# Patient Record
Sex: MALE | Race: WHITE
[De-identification: names, ages, dates, MRNs, and addresses within clinical notes are randomized per-mention and may not be internally consistent; named-entity substitution may affect disease eponyms.]

---

## 2019-03-08 ENCOUNTER — TRANSCRIPTION ENCOUNTER (OUTPATIENT)
Age: 19
End: 2019-03-08

## 2019-03-13 PROBLEM — Z00.00 ENCOUNTER FOR PREVENTIVE HEALTH EXAMINATION: Status: ACTIVE | Noted: 2019-03-13

## 2019-03-14 ENCOUNTER — APPOINTMENT (OUTPATIENT)
Dept: GASTROENTEROLOGY | Facility: CLINIC | Age: 19
End: 2019-03-14

## 2019-04-01 ENCOUNTER — TRANSCRIPTION ENCOUNTER (OUTPATIENT)
Age: 19
End: 2019-04-01

## 2019-04-17 ENCOUNTER — APPOINTMENT (OUTPATIENT)
Dept: GASTROENTEROLOGY | Facility: CLINIC | Age: 19
End: 2019-04-17
Payer: MEDICAID

## 2019-04-17 VITALS
HEIGHT: 68 IN | WEIGHT: 156 LBS | OXYGEN SATURATION: 98 % | HEART RATE: 74 BPM | BODY MASS INDEX: 23.64 KG/M2 | SYSTOLIC BLOOD PRESSURE: 120 MMHG | DIASTOLIC BLOOD PRESSURE: 78 MMHG | TEMPERATURE: 97.8 F

## 2019-04-17 DIAGNOSIS — Z87.891 PERSONAL HISTORY OF NICOTINE DEPENDENCE: ICD-10-CM

## 2019-04-17 DIAGNOSIS — Z78.9 OTHER SPECIFIED HEALTH STATUS: ICD-10-CM

## 2019-04-17 DIAGNOSIS — Z83.79 FAMILY HISTORY OF OTHER DISEASES OF THE DIGESTIVE SYSTEM: ICD-10-CM

## 2019-04-17 PROCEDURE — 99401 PREV MED CNSL INDIV APPRX 15: CPT

## 2019-04-17 PROCEDURE — 99204 OFFICE O/P NEW MOD 45 MIN: CPT

## 2019-04-17 NOTE — PHYSICAL EXAM
[General Appearance - Alert] : alert [General Appearance - In No Acute Distress] : in no acute distress [Sclera] : the sclera and conjunctiva were normal [PERRL With Normal Accommodation] : pupils were equal in size, round, and reactive to light [Extraocular Movements] : extraocular movements were intact [Outer Ear] : the ears and nose were normal in appearance [Neck Appearance] : the appearance of the neck was normal [Oropharynx] : the oropharynx was normal [Neck Cervical Mass (___cm)] : no neck mass was observed [Jugular Venous Distention Increased] : there was no jugular-venous distention [Thyroid Diffuse Enlargement] : the thyroid was not enlarged [Thyroid Nodule] : there were no palpable thyroid nodules [Auscultation Breath Sounds / Voice Sounds] : lungs were clear to auscultation bilaterally [Heart Rate And Rhythm] : heart rate was normal and rhythm regular [Heart Sounds] : normal S1 and S2 [Murmurs] : no murmurs [Heart Sounds Gallop] : no gallops [Heart Sounds Pericardial Friction Rub] : no pericardial rub [Bowel Sounds] : normal bowel sounds [Edema] : there was no peripheral edema [] : no hepato-splenomegaly [Abdomen Soft] : soft [Abdomen Mass (___ Cm)] : no abdominal mass palpated [Periumbilical] : in the periumbilical area [Epigastric] : in the epigastric area [Cervical Lymph Nodes Enlarged Anterior Bilaterally] : anterior cervical [Cervical Lymph Nodes Enlarged Posterior Bilaterally] : posterior cervical [Supraclavicular Lymph Nodes Enlarged Bilaterally] : supraclavicular [Nail Clubbing] : no clubbing  or cyanosis of the fingernails [Skin Turgor] : normal skin turgor [Skin Color & Pigmentation] : normal skin color and pigmentation [No Focal Deficits] : no focal deficits [Oriented To Time, Place, And Person] : oriented to person, place, and time [Impaired Insight] : insight and judgment were intact [Affect] : the affect was normal

## 2019-04-17 NOTE — ASSESSMENT
[FreeTextEntry1] : After reviewing the images from his CT, hearing his story, and his physical exam, I believe his symptoms to be most likely to be secondary to chronic constipation.  I do not think his symptoms are secondary to gastritis, esophagitis or gallbladder related.  I have advised him to increase his intake of fresh fruits and vegetables, eliminate binding foods like rice and bananas, and to start MiraLAX once daily and to titrate to effect.  Advised him to stop the omeprazole, as I do not think it is helping or indicated.  I will see him again in six weeks, at which time we can discuss the need for an EGD, which I do not think he needs at this time.\par \par Counseled patient about the dangers of using vaporized nicotine.

## 2019-04-17 NOTE — HISTORY OF PRESENT ILLNESS
[de-identified] : This is a 19-year-old male patient presenting for evaluation for generalized abdominal pain.  He reports feeling "spasming" in his lower epigastrium, excessive eructation and periumbilical burning.  He was prescribed omeprazole after visit to , which he thinks may have helped a little.  He underwent a CT A/P in February that revealed a large stool burden in the colon.  He reports moving his bowels daily, and describes his stool as [place] 2.  He has lost 15-lbs over the past 4-5 months after changing his diet and exercising.  Denies nausea, vomiting, diarrhea, or rectal bleeding.  Says his symptoms are worse after eating dairy.  There is no family history of colorectal cancer, IBD or celiac disease.  Denies heartburn.

## 2019-05-29 ENCOUNTER — APPOINTMENT (OUTPATIENT)
Dept: GASTROENTEROLOGY | Facility: CLINIC | Age: 19
End: 2019-05-29

## 2019-08-08 ENCOUNTER — TRANSCRIPTION ENCOUNTER (OUTPATIENT)
Age: 19
End: 2019-08-08

## 2019-08-09 ENCOUNTER — APPOINTMENT (OUTPATIENT)
Dept: GASTROENTEROLOGY | Facility: CLINIC | Age: 19
End: 2019-08-09
Payer: COMMERCIAL

## 2019-08-31 ENCOUNTER — TRANSCRIPTION ENCOUNTER (OUTPATIENT)
Age: 19
End: 2019-08-31

## 2019-09-09 ENCOUNTER — TRANSCRIPTION ENCOUNTER (OUTPATIENT)
Age: 19
End: 2019-09-09

## 2019-09-12 ENCOUNTER — EMERGENCY (EMERGENCY)
Facility: HOSPITAL | Age: 19
LOS: 1 days | Discharge: HOME | End: 2019-09-12
Attending: EMERGENCY MEDICINE | Admitting: EMERGENCY MEDICINE
Payer: MEDICAID

## 2019-09-12 VITALS
TEMPERATURE: 98 F | SYSTOLIC BLOOD PRESSURE: 111 MMHG | OXYGEN SATURATION: 98 % | WEIGHT: 169.09 LBS | DIASTOLIC BLOOD PRESSURE: 74 MMHG | RESPIRATION RATE: 18 BRPM | HEART RATE: 69 BPM | HEIGHT: 67 IN

## 2019-09-12 VITALS
OXYGEN SATURATION: 100 % | SYSTOLIC BLOOD PRESSURE: 115 MMHG | DIASTOLIC BLOOD PRESSURE: 72 MMHG | HEART RATE: 77 BPM | RESPIRATION RATE: 18 BRPM

## 2019-09-12 DIAGNOSIS — R42 DIZZINESS AND GIDDINESS: ICD-10-CM

## 2019-09-12 PROCEDURE — 99284 EMERGENCY DEPT VISIT MOD MDM: CPT

## 2019-09-12 PROCEDURE — 96374 THER/PROPH/DIAG INJ IV PUSH: CPT

## 2019-09-12 PROCEDURE — 99284 EMERGENCY DEPT VISIT MOD MDM: CPT | Mod: 25

## 2019-09-12 RX ORDER — SODIUM CHLORIDE 9 MG/ML
1000 INJECTION INTRAMUSCULAR; INTRAVENOUS; SUBCUTANEOUS ONCE
Refills: 0 | Status: COMPLETED | OUTPATIENT
Start: 2019-09-12 | End: 2019-09-12

## 2019-09-12 RX ORDER — METOCLOPRAMIDE HCL 10 MG
10 TABLET ORAL ONCE
Refills: 0 | Status: COMPLETED | OUTPATIENT
Start: 2019-09-12 | End: 2019-09-12

## 2019-09-12 RX ORDER — ACETAMINOPHEN 500 MG
650 TABLET ORAL ONCE
Refills: 0 | Status: COMPLETED | OUTPATIENT
Start: 2019-09-12 | End: 2019-09-12

## 2019-09-12 RX ADMIN — SODIUM CHLORIDE 1000 MILLILITER(S): 9 INJECTION INTRAMUSCULAR; INTRAVENOUS; SUBCUTANEOUS at 20:12

## 2019-09-12 RX ADMIN — Medication 10 MILLIGRAM(S): at 20:12

## 2019-09-12 RX ADMIN — Medication 650 MILLIGRAM(S): at 20:12

## 2019-09-12 NOTE — ED PROVIDER NOTE - CLINICAL SUMMARY MEDICAL DECISION MAKING FREE TEXT BOX
19 yr old male with no pmhx presents to c/o fatigue, nausea, with one episode of vomiting today. Also c/o frontal headache, took excedrin migraine 3 hours ago. Pt was seen at [facility] 3 days ago due to chest pain and had workup and dc home. Denies any diarrhea, fever, chills, neck pain, sick contacts, recent travel, chest pain, sob, dizziness.    normal exam, neurologically intact, abdomen- soft non tender   reglan, tylenol, fluids given- pt feeling better and tolerating po in ED   pt stable for discharge and to follow up with pmd

## 2019-09-12 NOTE — ED ADULT NURSE NOTE - OBJECTIVE STATEMENT
20 y/o male came in c/o 3 days of ha nausea dizziness and chest pain. pt was seen at [facility] 3 days ago for chest pain was worked up and d/c. pt states he came in today bc of headache in temperal area not resolved. pt states he had 1 episode of vomiting. pt states dizziness like the room is spinning. pts neuro intact. no chest pain at this time. MABEL. pt recently quit vaping 5 days ago and believes this could be the reason.

## 2019-09-12 NOTE — ED ADULT NURSE NOTE - CHIEF COMPLAINT QUOTE
Pt c/o feeling dizzy with nausea/vomiting, feels "squeezing" on his temples, took excedrin migraine 3 hours ago, pt was in [place] 3 days ago for chest pain

## 2019-09-12 NOTE — ED PROVIDER NOTE - OBJECTIVE STATEMENT
19 yr old male with no pmhx presents to c/o fatigue, nausea, with one episode of vomiting today. Also c/o frontal headache, took excedrin migraine 3 hours ago. Pt was seen at [facility] 3 days ago due to chest pain and had workup and dc home. Denies any diarrhea, fever, chills, neck pain, sick contacts, recent travel, chest pain, sob, dizziness.

## 2019-09-12 NOTE — ED PROVIDER NOTE - NSFOLLOWUPINSTRUCTIONS_ED_ALL_ED_FT
General Headache    WHAT YOU NEED TO KNOW:    Headache pain may be mild or severe. Common causes include stress, medicines, and head injuries. Sleep problems, allergies, and hormone changes can also cause a headache. You may have frequent headaches that have no clear cause. Pain may start in another part of your body and move to your head. Headache pain can also move to other parts of your body. A headache can cause other symptoms, such as nausea and vomiting. A severe headache may be a sign of a stroke or other serious problem that needs immediate treatment.    DISCHARGE INSTRUCTIONS:    Call 911 for any of the following:     You have any of the following signs of a stroke:   Numbness or drooping on one side of your face       Weakness in an arm or leg      Confusion or difficulty speaking      Dizziness, a severe headache, or vision loss        Return to the emergency department if:     You have a headache with neck stiffness and a fever.      You have a constant headache and are vomiting.      You have severe pain that does not get better after you take pain medicine.      You have a headache and the pain worsens when you look into light.      You have a headache and vision changes, such as blurred vision.      You have a headache and are forgetful or confused.    Contact your healthcare provider if:     You have a headache each day that does not get better, even after treatment.      You have changes in your headaches, or new symptoms that occur when you have a headache.      Others you live or work with also have headaches.      You have questions or concerns about your condition or care.    Medicines: You may need any of the following:     Medicines may be given to prevent or treat headache pain. Do not wait until the pain is severe to take your medicine. Ask your healthcare provider how to take the medicine safely.       NSAIDs, such as ibuprofen, help decrease swelling, pain, and fever. This medicine is available with or without a doctor's order. NSAIDs can cause stomach bleeding or kidney problems in certain people. If you take blood thinner medicine, always ask if NSAIDs are safe for you. Always read the medicine label and follow directions. Do not give these medicines to children under 6 months of age without direction from your child's healthcare provider.      Acetaminophen decreases pain and fever. It is available without a doctor's order. Ask how much to take and how often to take it. Follow directions. Read the labels of all other medicines you are using to see if they also contain acetaminophen, or ask your doctor or pharmacist. Acetaminophen can cause liver damage if not taken correctly. Do not use more than 4 grams (4,000 milligrams) total of acetaminophen in one day.       Antinausea medicine may be given to calm your stomach and help prevent vomiting.      Take your medicine as directed. Contact your healthcare provider if you think your medicine is not helping or if you have side effects. Tell him of her if you are allergic to any medicine. Keep a list of the medicines, vitamins, and herbs you take. Include the amounts, and when and why you take them. Bring the list or the pill bottles to follow-up visits. Carry your medicine list with you in case of an emergency.    Manage your symptoms:     Rest in a dark and quiet room. This may help decrease your pain.      Apply heat or ice as directed. Heat or ice may help decrease pain or muscle spasms. Apply heat or ice on the area for 20 minutes every 2 hours for as many days as directed. Your healthcare provider may recommend that you alternate heat and ice.      Relax your muscles to help relieve a headache. Lie down in a comfortable position and close your eyes. Relax your muscles slowly. Start at your toes and work your way up your body. A massage or warm bath may also help relax your muscles.    Keep a headache record: Record the dates and times that you get headaches, and what you were doing before the headache started. Also record what you ate and drank in the 24 hours before the headache started. This might help your healthcare provider find the cause of your headaches and make a treatment plan. The record can also help you avoid headache triggers or manage your symptoms.    Get enough sleep: You should get 8 to 10 hours of sleep each night. Create a sleep schedule. Go to bed and wake up at the same times each day. It may be helpful to do something relaxing before bed. Do not watch television right before bed.    Do not smoke: Nicotine and other chemicals in cigarettes and cigars can trigger a headache or make it worse. Ask your healthcare provider for information if you currently smoke and need help to quit. E-cigarettes or smokeless tobacco still contain nicotine. Talk to your healthcare provider before you use these products.     Drink liquids as directed: You may need to drink more liquid to prevent dehydration. Dehydration can cause a headache. Ask your healthcare provider how much liquid to drink each day and which liquids are best for you.     Limit caffeine and alcohol as directed: Your headaches may be triggered by caffeine or alcohol. You may also develop a headache if you drink caffeine regularly and suddenly stop.    Eat a variety of healthy foods: Do not skip meals. Too little food can trigger a headache. Include fruits, vegetables, whole-grain breads, low-fat dairy products, beans, lean meat, and fish. Do not have trigger foods, such as chocolate and red wine. Foods that contain gluten, nitrates, MSG, or artificial sweeteners may also trigger a headache.    Follow up with your healthcare provider as directed: Write down your questions so you remember to ask them during your visits.

## 2019-09-12 NOTE — ED PROVIDER NOTE - CARE PROVIDER_API CALL
Silvano Arango (MD)  Neurology  [address], [address]  [place], [place] [id number]  Phone: [phone]  Fax: [phone]  Follow Up Time:

## 2019-09-12 NOTE — ED ADULT TRIAGE NOTE - CHIEF COMPLAINT QUOTE
Pt c/o feeling dizzy with nausea/vomiting, feels "squeezing" on his temples, took excedrin migraine 3 hours ago, pt was in [place] 3 days ago for chest pain

## 2019-09-12 NOTE — ED PROVIDER NOTE - ATTENDING CONTRIBUTION TO CARE
I have personally seen and examined this patient. I have fully participated in the care of this patient. I have reviewed all pertinent clinical information, including history physical exam, plan and the PA's note and agree except as noted  19 yr old male with no pmhx presents to c/o fatigue, nausea, with one episode of vomiting today. Also c/o frontal headache, took excedrin migraine 3 hours ago. Pt was seen at [facility] 3 days ago due to chest pain and had workup and dc home. Denies any diarrhea, fever, chills, neck pain, sick contacts, recent travel, chest pain, sob, dizziness.  PE: General:     NAD, well-nourished, well-appearing  Head:     NC/AT, EOMI, oral mucosa moist  Neck:     supple  Lungs:     CTA b/l, no w/r/r  CVS:     S1S2, RRR, no m/g/r  Abd:     +BS, s/nt/nd, no organomegaly  Ext:    2+ radial and pedal pulses, no c/c/e  Neuro: grossly intact

## 2019-09-12 NOTE — ED PROVIDER NOTE - PATIENT PORTAL LINK FT
You can access the FollowMyHealth Patient Portal offered by [facility] by registering at the following website: [email or web address]. By joining [employer]’s FollowMyHealth portal, you will also be able to view your health information using other applications (apps) compatible with our system.

## 2019-09-24 ENCOUNTER — APPOINTMENT (OUTPATIENT)
Dept: GASTROENTEROLOGY | Facility: CLINIC | Age: 19
End: 2019-09-24
Payer: COMMERCIAL

## 2019-09-24 VITALS
HEART RATE: 74 BPM | SYSTOLIC BLOOD PRESSURE: 119 MMHG | RESPIRATION RATE: 16 BRPM | DIASTOLIC BLOOD PRESSURE: 71 MMHG | OXYGEN SATURATION: 99 %

## 2019-09-24 DIAGNOSIS — Z78.9 OTHER SPECIFIED HEALTH STATUS: ICD-10-CM

## 2019-09-24 PROCEDURE — 99214 OFFICE O/P EST MOD 30 MIN: CPT

## 2019-09-24 RX ORDER — POLYETHYLENE GLYCOL 3350 17 G/17G
17 POWDER, FOR SOLUTION ORAL DAILY
Qty: 1 | Refills: 11 | Status: ACTIVE | COMMUNITY
Start: 2019-04-17 | End: 1900-01-01

## 2019-09-24 NOTE — ASSESSMENT
[FreeTextEntry1] : Again, I believe this is suffering from irritable bowel syndrome with constipation.  I advised them to restart MiraLax 17 g once daily and titrate the dose and timing to the desired effect of one soft bowel movement daily.  Regards to his intermittent chest pain, I suspect that this is anxiety related, as he received no improvement with a proton pump inhibitor.  I offered an EGD for further evaluation, and the patient would like to hold off for now.  I also advised that he purchased some over-the-counter IBGard.\par \par Follow up with me in in March or sooner if necessary

## 2019-09-24 NOTE — HISTORY OF PRESENT ILLNESS
[de-identified] : This is a pleasant well interval appointment for this 19-year-old male patient who initially saw in April or diffuse abdominal pain, for which he underwent a CT abdomen and pelvis that was unrevealing.  I thought at that time that he was suffering from chronic constipation, and I started him on MiraLax 17 g once daily.  He reported that his abdominal symptoms abated while he was taking the MiraLax but stopped for unclear reasons.  He reports he is moving his bowels every 2-3 days.  He also complains of borborygmi accompanied by random, fleeting abdominal pains.  He also reports that on two occasions within the last couple of weeks, he cleared his throat and a spat up blood.  He denies any rectal bleeding or melena.  He denies any nausea or vomiting.  He reports intermittent chest pain for which he went to the emergency department, underwent an electrocardiogram and chest x-ray that were negative.

## 2019-09-24 NOTE — PHYSICAL EXAM
[General Appearance - Alert] : alert [Sclera] : the sclera and conjunctiva were normal [General Appearance - In No Acute Distress] : in no acute distress [Extraocular Movements] : extraocular movements were intact [PERRL With Normal Accommodation] : pupils were equal in size, round, and reactive to light [Outer Ear] : the ears and nose were normal in appearance [Oropharynx] : the oropharynx was normal [Neck Appearance] : the appearance of the neck was normal [Neck Cervical Mass (___cm)] : no neck mass was observed [Jugular Venous Distention Increased] : there was no jugular-venous distention [Thyroid Nodule] : there were no palpable thyroid nodules [Thyroid Diffuse Enlargement] : the thyroid was not enlarged [Auscultation Breath Sounds / Voice Sounds] : lungs were clear to auscultation bilaterally [Heart Rate And Rhythm] : heart rate was normal and rhythm regular [Heart Sounds Gallop] : no gallops [Heart Sounds] : normal S1 and S2 [Murmurs] : no murmurs [Heart Sounds Pericardial Friction Rub] : no pericardial rub [Edema] : there was no peripheral edema [Bowel Sounds] : normal bowel sounds [Abdomen Mass (___ Cm)] : no abdominal mass palpated [Abdomen Soft] : soft [] : no hepato-splenomegaly [Periumbilical] : in the periumbilical area [Epigastric] : in the epigastric area [Cervical Lymph Nodes Enlarged Posterior Bilaterally] : posterior cervical [Supraclavicular Lymph Nodes Enlarged Bilaterally] : supraclavicular [Cervical Lymph Nodes Enlarged Anterior Bilaterally] : anterior cervical [Skin Color & Pigmentation] : normal skin color and pigmentation [Nail Clubbing] : no clubbing  or cyanosis of the fingernails [Skin Turgor] : normal skin turgor [No Focal Deficits] : no focal deficits [Impaired Insight] : insight and judgment were intact [Oriented To Time, Place, And Person] : oriented to person, place, and time [Affect] : the affect was normal

## 2019-10-05 ENCOUNTER — EMERGENCY (EMERGENCY)
Facility: HOSPITAL | Age: 19
LOS: 1 days | Discharge: HOME | End: 2019-10-05
Attending: INTERNAL MEDICINE | Admitting: INTERNAL MEDICINE
Payer: MEDICAID

## 2019-10-05 ENCOUNTER — TRANSCRIPTION ENCOUNTER (OUTPATIENT)
Age: 19
End: 2019-10-05

## 2019-10-05 VITALS
SYSTOLIC BLOOD PRESSURE: 121 MMHG | DIASTOLIC BLOOD PRESSURE: 69 MMHG | RESPIRATION RATE: 15 BRPM | HEART RATE: 67 BPM | TEMPERATURE: 98 F | OXYGEN SATURATION: 100 %

## 2019-10-05 PROBLEM — Z78.9 OTHER SPECIFIED HEALTH STATUS: Chronic | Status: ACTIVE | Noted: 2019-09-12

## 2019-10-05 LAB — OB PNL STL: NEGATIVE — SIGNIFICANT CHANGE UP

## 2019-10-05 PROCEDURE — 99283 EMERGENCY DEPT VISIT LOW MDM: CPT

## 2019-10-05 RX ORDER — SODIUM CHLORIDE 9 MG/ML
1000 INJECTION INTRAMUSCULAR; INTRAVENOUS; SUBCUTANEOUS ONCE
Refills: 0 | Status: DISCONTINUED | OUTPATIENT
Start: 2019-10-05 | End: 2019-10-05

## 2019-10-05 RX ORDER — FAMOTIDINE 10 MG/ML
20 INJECTION INTRAVENOUS ONCE
Refills: 0 | Status: DISCONTINUED | OUTPATIENT
Start: 2019-10-05 | End: 2019-10-05

## 2019-10-05 NOTE — ED PROVIDER NOTE - CPE EDP GASTRO NORM
11/19/20        Minesh Abernathy  [address] [facility] [address]      Dear Higinio Erickson,    [address] [facility] records indicate that you have outstanding lab work and or testing that was ordered for you and has not yet been completed:  Orders Placed This Encounter - - -

## 2019-10-05 NOTE — ED PROVIDER NOTE - PROGRESS NOTE DETAILS
MICHAEL Wagner: pt feels better, OBT negative, recent CT report no acute findings.  Discharge and results reviewed with patient.

## 2019-10-05 NOTE — ED PROVIDER NOTE - NSFOLLOWUPINSTRUCTIONS_ED_ALL_ED_FT
Follow up with your Primary Medical Doctor in 1-2 days.  Follow up with your Gastroenterologist in 1-2 days or see attached list.  Rest,  Drink plenty of fluids.  Return to the ER for any persistent/worsening or new symptoms fevers, chills, abdominal pain, nausea, vomiting or any concerning symptoms.

## 2019-10-05 NOTE — ED PROVIDER NOTE - ATTENDING CONTRIBUTION TO CARE
LOIS Benitez MD performed a history and physical exam of the patient and discussed their management with the resident and /or advanced care provider. I reviewed the resident and /or ACP's note and agree with the documented findings and plan of care. My medical decision making and observations are found above.    Awake, Alert, Conversant.  Resting comfortably.  Breath sounds clear in all lung fields.  Normal and regular heart rate without murmurs, rubs, or gallops.  Normal S1/S2.  Abdomen soft and nontender without rebound/guarding, no CVAT.  No lower extremity swelling or tenderness.  Oriented and conversant with fluent speech, moving all extremities with good strength.

## 2019-10-05 NOTE — ED PROVIDER NOTE - PATIENT PORTAL LINK FT
You can access the FollowMyHealth Patient Portal offered by [facility] by registering at the following website: [email or web address]. By joining [employer]’s FollowMyHealth portal, you will also be able to view your health information using other applications (apps) compatible with our system.

## 2019-10-05 NOTE — ED ADULT TRIAGE NOTE - CHIEF COMPLAINT QUOTE
Pt c/o lower abdominal pain with dark stools for the past 4 days.  Denies any nausea/vomiting.  Denies any urinary symptoms.  Pt states is being followed by gastro for the past 7 months for upper abdominal pain that's states "he doesn't need anything."  Was evaluated at [facility] today that told him to ED for ultrasound.  Denies any PHMx.

## 2019-10-05 NOTE — ED PROVIDER NOTE - OBJECTIVE STATEMENT
18 y/o male with a hx of IBS presents to the ER c/o 3-4 days of intermittent crampy abdominal pain.  Pt reports an episode of dark stools this AM, non bloody, non watery.  Pt reports similar pain intermittent over there past 7-8 months.  Pt had negative CT scan approximately 6 months ago.  Pt denies fevers, chills, nausea, vomiting, weakness, dizziness, back pain, dysuria, frequency, hematuria.

## 2019-10-05 NOTE — ED PROVIDER NOTE - CLINICAL SUMMARY MEDICAL DECISION MAKING FREE TEXT BOX
Dr. Benitez: I agree with the above provided history and exam and addend/modify it as follows.  19 M hx abdominal currently undergoing outpatient work-up: possibly IBS P/W lower abdominal pain.  Also noted constipation lately with a cramping and intermittent component to the pain Initially mildly tender on exam, no longer noted on re-exam.  Afebrile and without nausea/vomiting.  No bloody or melenic stools.  Tolerating a diet well.  Likely constipation.  Doubt appendicitis given the patient is tolerating PO without nausea/vomiting, afebrile, with a benign exam.  Not c/w diverticulitis/colitis given the patient afebrile and without diarrhea.  Plan for PO hydration, stable for discharge to outpatient work-up, increase outpatient bowel regimen.

## 2019-10-16 ENCOUNTER — APPOINTMENT (OUTPATIENT)
Dept: GASTROENTEROLOGY | Facility: CLINIC | Age: 19
End: 2019-10-16

## 2019-10-16 ENCOUNTER — APPOINTMENT (OUTPATIENT)
Dept: GASTROENTEROLOGY | Facility: CLINIC | Age: 19
End: 2019-10-16
Payer: COMMERCIAL

## 2019-10-16 VITALS
HEIGHT: 68 IN | HEART RATE: 70 BPM | WEIGHT: 160 LBS | SYSTOLIC BLOOD PRESSURE: 116 MMHG | BODY MASS INDEX: 24.25 KG/M2 | TEMPERATURE: 98.4 F | OXYGEN SATURATION: 98 % | DIASTOLIC BLOOD PRESSURE: 78 MMHG

## 2019-10-16 PROCEDURE — 99215 OFFICE O/P EST HI 40 MIN: CPT

## 2019-10-16 NOTE — PHYSICAL EXAM
[General Appearance - Alert] : alert [General Appearance - In No Acute Distress] : in no acute distress [Sclera] : the sclera and conjunctiva were normal [PERRL With Normal Accommodation] : pupils were equal in size, round, and reactive to light [Extraocular Movements] : extraocular movements were intact [Outer Ear] : the ears and nose were normal in appearance [Oropharynx] : the oropharynx was normal [Neck Appearance] : the appearance of the neck was normal [Neck Cervical Mass (___cm)] : no neck mass was observed [Jugular Venous Distention Increased] : there was no jugular-venous distention [Thyroid Diffuse Enlargement] : the thyroid was not enlarged [Thyroid Nodule] : there were no palpable thyroid nodules [Auscultation Breath Sounds / Voice Sounds] : lungs were clear to auscultation bilaterally [Heart Rate And Rhythm] : heart rate was normal and rhythm regular [Heart Sounds] : normal S1 and S2 [Heart Sounds Gallop] : no gallops [Murmurs] : no murmurs [Heart Sounds Pericardial Friction Rub] : no pericardial rub [Edema] : there was no peripheral edema [Bowel Sounds] : normal bowel sounds [Abdomen Soft] : soft [] : no hepato-splenomegaly [Abdomen Mass (___ Cm)] : no abdominal mass palpated [Suprapubic] : in the suprapubic area [LLQ] : in the left lower quadrant [Cervical Lymph Nodes Enlarged Posterior Bilaterally] : posterior cervical [Cervical Lymph Nodes Enlarged Anterior Bilaterally] : anterior cervical [Supraclavicular Lymph Nodes Enlarged Bilaterally] : supraclavicular [Nail Clubbing] : no clubbing  or cyanosis of the fingernails [Skin Color & Pigmentation] : normal skin color and pigmentation [Skin Turgor] : normal skin turgor [No Focal Deficits] : no focal deficits [Oriented To Time, Place, And Person] : oriented to person, place, and time [Impaired Insight] : insight and judgment were intact [Affect] : the affect was normal

## 2019-10-16 NOTE — ASSESSMENT
[FreeTextEntry1] : Will schedule diagnostic colonoscopy to rule out IBD, malignancy.  MoviPrep ordered.

## 2019-10-16 NOTE — HISTORY OF PRESENT ILLNESS
[de-identified] : This is a pleasant 19-year-old male patient who initially saw in April or diffuse abdominal pain, for which he underwent a CT abdomen and pelvis that was unrevealing.  I thought at that time that he was suffering from chronic constipation, and I started him on MiraLax 17 g once daily.  He reported that his abdominal symptoms abated while he was taking the MiraLax but stopped for unclear reasons.  He has presented to the ED twice since his index appointment with me, most recently about a week ago with complaints of dark stool (FOBT negative) and LLQ pain.  He was told he was suffering from constipation and was discharged with normal labs and no imaging.  He continues to experience unrelenting suprapubic and LLQ pain.  He reports that his stools have changed in consistency and that he has little appetite for food.  Tried IBGard, which was not helpful.  Restarted MiraLAX but stopped because it made him feel "weird".

## 2019-12-12 ENCOUNTER — TRANSCRIPTION ENCOUNTER (OUTPATIENT)
Age: 19
End: 2019-12-12

## 2020-02-11 ENCOUNTER — RX CHANGE (OUTPATIENT)
Age: 20
End: 2020-02-11

## 2020-02-13 ENCOUNTER — OUTPATIENT (OUTPATIENT)
Dept: OUTPATIENT SERVICES | Facility: HOSPITAL | Age: 20
LOS: 1 days | End: 2020-02-13
Payer: COMMERCIAL

## 2020-02-13 ENCOUNTER — APPOINTMENT (OUTPATIENT)
Dept: GASTROENTEROLOGY | Facility: GI CENTER | Age: 20
End: 2020-02-13
Payer: MEDICAID

## 2020-02-13 DIAGNOSIS — R19.4 CHANGE IN BOWEL HABIT: ICD-10-CM

## 2020-02-13 DIAGNOSIS — K58.1 IRRITABLE BOWEL SYNDROME WITH CONSTIPATION: ICD-10-CM

## 2020-02-13 DIAGNOSIS — R10.32 LEFT LOWER QUADRANT PAIN: ICD-10-CM

## 2020-02-13 DIAGNOSIS — R63.0 ANOREXIA: ICD-10-CM

## 2020-02-13 DIAGNOSIS — R19.8 OTHER SPECIFIED SYMPTOMS AND SIGNS INVOLVING THE DIGESTIVE SYSTEM AND ABDOMEN: ICD-10-CM

## 2020-02-13 DIAGNOSIS — R10.2 PELVIC AND PERINEAL PAIN: ICD-10-CM

## 2020-02-13 PROCEDURE — 45378 DIAGNOSTIC COLONOSCOPY: CPT

## 2020-02-13 RX ORDER — POLYETHYLENE GLYOCOL 3350, SODIUM CHLORIDE, SODIUM BICARBONATE AND POTASSIUM CHLORIDE 420; 11.2; 5.72; 1.48 G/4L; G/4L; G/4L; G/4L
420 POWDER, FOR SOLUTION NASOGASTRIC; ORAL
Qty: 1 | Refills: 0 | Status: COMPLETED | COMMUNITY
Start: 2020-02-11 | End: 2020-02-13

## 2020-02-13 RX ORDER — POLYETHYLENE GLYCOL 3350, SODIUM SULFATE, SODIUM CHLORIDE, POTASSIUM CHLORIDE, ASCORBIC ACID, SODIUM ASCORBATE 7.5-2.691G
100 KIT ORAL
Qty: 1 | Refills: 0 | Status: COMPLETED | COMMUNITY
Start: 2019-10-16 | End: 2020-02-13

## 2020-02-13 NOTE — PHYSICAL EXAM
[General Appearance - Alert] : alert [General Appearance - In No Acute Distress] : in no acute distress [PERRL With Normal Accommodation] : pupils were equal in size, round, and reactive to light [Sclera] : the sclera and conjunctiva were normal [Extraocular Movements] : extraocular movements were intact [Outer Ear] : the ears and nose were normal in appearance [Oropharynx] : the oropharynx was normal [Neck Appearance] : the appearance of the neck was normal [Neck Cervical Mass (___cm)] : no neck mass was observed [Jugular Venous Distention Increased] : there was no jugular-venous distention [Thyroid Diffuse Enlargement] : the thyroid was not enlarged [Thyroid Nodule] : there were no palpable thyroid nodules [Auscultation Breath Sounds / Voice Sounds] : lungs were clear to auscultation bilaterally [Heart Rate And Rhythm] : heart rate was normal and rhythm regular [Heart Sounds] : normal S1 and S2 [Heart Sounds Gallop] : no gallops [Murmurs] : no murmurs [Edema] : there was no peripheral edema [Heart Sounds Pericardial Friction Rub] : no pericardial rub [Bowel Sounds] : normal bowel sounds [Abdomen Soft] : soft [] : no hepato-splenomegaly [LLQ] : in the left lower quadrant [Suprapubic] : in the suprapubic area [Abdomen Mass (___ Cm)] : no abdominal mass palpated [Cervical Lymph Nodes Enlarged Posterior Bilaterally] : posterior cervical [Cervical Lymph Nodes Enlarged Anterior Bilaterally] : anterior cervical [Supraclavicular Lymph Nodes Enlarged Bilaterally] : supraclavicular [Nail Clubbing] : no clubbing  or cyanosis of the fingernails [Skin Color & Pigmentation] : normal skin color and pigmentation [Skin Turgor] : normal skin turgor [No Focal Deficits] : no focal deficits [Oriented To Time, Place, And Person] : oriented to person, place, and time [Impaired Insight] : insight and judgment were intact [Affect] : the affect was normal

## 2020-02-13 NOTE — PHYSICAL EXAM
[General Appearance - Alert] : alert [General Appearance - In No Acute Distress] : in no acute distress [Sclera] : the sclera and conjunctiva were normal [PERRL With Normal Accommodation] : pupils were equal in size, round, and reactive to light [Extraocular Movements] : extraocular movements were intact [Outer Ear] : the ears and nose were normal in appearance [Oropharynx] : the oropharynx was normal [Neck Cervical Mass (___cm)] : no neck mass was observed [Neck Appearance] : the appearance of the neck was normal [Jugular Venous Distention Increased] : there was no jugular-venous distention [Thyroid Diffuse Enlargement] : the thyroid was not enlarged [Thyroid Nodule] : there were no palpable thyroid nodules [Auscultation Breath Sounds / Voice Sounds] : lungs were clear to auscultation bilaterally [Heart Rate And Rhythm] : heart rate was normal and rhythm regular [Heart Sounds] : normal S1 and S2 [Heart Sounds Gallop] : no gallops [Murmurs] : no murmurs [Edema] : there was no peripheral edema [Heart Sounds Pericardial Friction Rub] : no pericardial rub [Abdomen Soft] : soft [Bowel Sounds] : normal bowel sounds [] : no hepato-splenomegaly [Abdomen Mass (___ Cm)] : no abdominal mass palpated [LLQ] : in the left lower quadrant [Suprapubic] : in the suprapubic area [Cervical Lymph Nodes Enlarged Posterior Bilaterally] : posterior cervical [Cervical Lymph Nodes Enlarged Anterior Bilaterally] : anterior cervical [Nail Clubbing] : no clubbing  or cyanosis of the fingernails [Supraclavicular Lymph Nodes Enlarged Bilaterally] : supraclavicular [Skin Color & Pigmentation] : normal skin color and pigmentation [Skin Turgor] : normal skin turgor [Oriented To Time, Place, And Person] : oriented to person, place, and time [No Focal Deficits] : no focal deficits [Affect] : the affect was normal [Impaired Insight] : insight and judgment were intact

## 2020-02-13 NOTE — PROCEDURE
[Change in Bowel Habits] : change in bowel habits [Abdominal Pain] : abdominal pain [Procedure Explained] : The procedure was explained [Allergies Reviewed] : allergies reviewed. [Risks] : Risks [Benefits] : benefits [Alternatives] : alternatives [Consent Obtained] : written consent was obtained prior to the procedure and is detailed in the patient's record [Infection] : risk of infection [Bleeding] : bleeding risk [Patient] : the patient [Bowel Prep Kit] : the patient took the appropriate bowel preparation kit as directed [Approved Diet Followed] : the patient avoided solid foods and adhered to the approved diet list for 24 hours prior to the procedure [Automated Blood Pressure Cuff] : automated blood pressure cuff [Cardiac Monitor] : cardiac monitor [Pulse Oximeter] : pulse oximeter [2] : 2 [Sedation Clearance] : the patient was cleared for moderate sedation [Performed By: ___] : Performed by:  NICK [Propofol ___ mg IV] : Propofol [unfilled] ~Umg intravenously [Prep Qualtiy: ___] : Prep Quality:  [unfilled] [Left Lateral Decubitus] : The patient was positioned in the left lateral decubitus position [Withdrawal Time: ___] : Withdrawal Time:  [unfilled] [Abnormal Rectum] : a normal rectum [External Hemorrhoids] : no external hemorrhoids [Normal Prostate] : a normal prostate [Cecum (Landmarks)] : and guided to the cecum which was identified by the anatomic landmarks of the appendiceal orifice and ileocecal valve [Terminal Ileum via Ileocecal Valve] : and the terminal ileum was examined by entering the ileocecal valve [No Difficulty] : without difficulty [Single Pass Needed] : after a single pass [Retroflex View] : a retroflex view of the rectum was performed [Insufflated] : insufflated [Patient Rotated Into Alternating Positions] : the patient was not rotated [Tolerated Well] : the patient tolerated the procedure well [Normal] : Normal [No Complications] : There were no complications [Vital Signs Stable] : the vital signs were stable [FreeTextEntry2] : [employer] [id number]

## 2020-02-13 NOTE — PROCEDURE
[Change in Bowel Habits] : change in bowel habits [Abdominal Pain] : abdominal pain [Procedure Explained] : The procedure was explained [Allergies Reviewed] : allergies reviewed. [Risks] : Risks [Benefits] : benefits [Alternatives] : alternatives [Consent Obtained] : written consent was obtained prior to the procedure and is detailed in the patient's record [Infection] : risk of infection [Bleeding] : bleeding risk [Patient] : the patient [Bowel Prep Kit] : the patient took the appropriate bowel preparation kit as directed [Approved Diet Followed] : the patient avoided solid foods and adhered to the approved diet list for 24 hours prior to the procedure [Automated Blood Pressure Cuff] : automated blood pressure cuff [Cardiac Monitor] : cardiac monitor [2] : 2 [Pulse Oximeter] : pulse oximeter [Sedation Clearance] : the patient was cleared for moderate sedation [Performed By: ___] : Performed by:  NICK [Prep Qualtiy: ___] : Prep Quality:  [unfilled] [Propofol ___ mg IV] : Propofol [unfilled] ~Umg intravenously [Withdrawal Time: ___] : Withdrawal Time:  [unfilled] [Left Lateral Decubitus] : The patient was positioned in the left lateral decubitus position [Abnormal Rectum] : a normal rectum [External Hemorrhoids] : no external hemorrhoids [Normal Prostate] : a normal prostate [Cecum (Landmarks)] : and guided to the cecum which was identified by the anatomic landmarks of the appendiceal orifice and ileocecal valve [Terminal Ileum via Ileocecal Valve] : and the terminal ileum was examined by entering the ileocecal valve [No Difficulty] : without difficulty [Insufflated] : insufflated [Retroflex View] : a retroflex view of the rectum was performed [Single Pass Needed] : after a single pass [Patient Rotated Into Alternating Positions] : the patient was not rotated [Normal] : Normal [Tolerated Well] : the patient tolerated the procedure well [Vital Signs Stable] : the vital signs were stable [No Complications] : There were no complications [FreeTextEntry2] : [employer] [id number]

## 2020-10-26 NOTE — ED PROVIDER NOTE - NS ED ATTENDING STATEMENT MOD
Midline Insertion I have personally performed a face to face diagnostic evaluation on this patient. I have reviewed the ACP note and agree with the history, exam and plan of care, except as noted.

## 2024-01-09 NOTE — ED PROVIDER NOTE - PSYCHIATRIC, MLM
Alert and oriented to person, place, time/situation. normal mood and affect. no apparent risk to self or others.
09-Jan-2024